# Patient Record
Sex: FEMALE | Race: WHITE | NOT HISPANIC OR LATINO | Employment: FULL TIME | URBAN - NONMETROPOLITAN AREA
[De-identification: names, ages, dates, MRNs, and addresses within clinical notes are randomized per-mention and may not be internally consistent; named-entity substitution may affect disease eponyms.]

---

## 2024-01-05 ENCOUNTER — TRANSCRIBE ORDERS (OUTPATIENT)
Dept: LAB | Facility: HOSPITAL | Age: 44
End: 2024-01-05

## 2024-01-05 ENCOUNTER — LAB (OUTPATIENT)
Dept: LAB | Facility: HOSPITAL | Age: 44
End: 2024-01-05
Payer: COMMERCIAL

## 2024-01-05 DIAGNOSIS — R74.8 ACID PHOSPHATASE ELEVATED: Primary | ICD-10-CM

## 2024-01-05 DIAGNOSIS — R74.8 ACID PHOSPHATASE ELEVATED: ICD-10-CM

## 2024-01-05 LAB
ALBUMIN SERPL-MCNC: 4.3 G/DL (ref 3.5–5.2)
ALP SERPL-CCNC: 71 U/L (ref 39–117)
ALT SERPL W P-5'-P-CCNC: 45 U/L (ref 1–33)
AST SERPL-CCNC: 81 U/L (ref 1–32)
BILIRUB CONJ SERPL-MCNC: <0.2 MG/DL (ref 0–0.3)
BILIRUB INDIRECT SERPL-MCNC: ABNORMAL MG/DL
BILIRUB SERPL-MCNC: 0.6 MG/DL (ref 0–1.2)
PROT SERPL-MCNC: 7.3 G/DL (ref 6–8.5)

## 2024-01-05 PROCEDURE — 36415 COLL VENOUS BLD VENIPUNCTURE: CPT

## 2024-01-05 PROCEDURE — 80076 HEPATIC FUNCTION PANEL: CPT

## 2024-01-11 ENCOUNTER — TRANSCRIBE ORDERS (OUTPATIENT)
Dept: ADMINISTRATIVE | Facility: HOSPITAL | Age: 44
End: 2024-01-11
Payer: COMMERCIAL

## 2024-01-11 DIAGNOSIS — R74.8 ELEVATED LIVER ENZYMES: Primary | ICD-10-CM

## 2024-01-26 ENCOUNTER — LAB (OUTPATIENT)
Dept: LAB | Facility: HOSPITAL | Age: 44
End: 2024-01-26
Payer: COMMERCIAL

## 2024-01-26 ENCOUNTER — TRANSCRIBE ORDERS (OUTPATIENT)
Dept: LAB | Facility: HOSPITAL | Age: 44
End: 2024-01-26
Payer: COMMERCIAL

## 2024-01-26 DIAGNOSIS — R79.89 HYPOURICEMIA: Primary | ICD-10-CM

## 2024-01-26 DIAGNOSIS — R79.89 HYPOURICEMIA: ICD-10-CM

## 2024-01-26 LAB
ALBUMIN SERPL-MCNC: 4.3 G/DL (ref 3.5–5.2)
ALP SERPL-CCNC: 66 U/L (ref 39–117)
ALT SERPL W P-5'-P-CCNC: 38 U/L (ref 1–33)
AST SERPL-CCNC: 70 U/L (ref 1–32)
BILIRUB CONJ SERPL-MCNC: <0.2 MG/DL (ref 0–0.3)
BILIRUB INDIRECT SERPL-MCNC: ABNORMAL MG/DL
BILIRUB SERPL-MCNC: 0.6 MG/DL (ref 0–1.2)
HAV IGM SERPL QL IA: NORMAL
HBV CORE IGM SERPL QL IA: NORMAL
HBV SURFACE AG SERPL QL IA: NORMAL
HCV AB SER DONR QL: NORMAL
PROT SERPL-MCNC: 7.3 G/DL (ref 6–8.5)

## 2024-01-26 PROCEDURE — 80076 HEPATIC FUNCTION PANEL: CPT

## 2024-01-26 PROCEDURE — 80074 ACUTE HEPATITIS PANEL: CPT

## 2024-01-26 PROCEDURE — 36415 COLL VENOUS BLD VENIPUNCTURE: CPT

## 2024-02-06 ENCOUNTER — HOSPITAL ENCOUNTER (OUTPATIENT)
Dept: ULTRASOUND IMAGING | Facility: HOSPITAL | Age: 44
Discharge: HOME | End: 2024-02-06
Admitting: INTERNAL MEDICINE
Payer: COMMERCIAL

## 2024-02-06 DIAGNOSIS — R74.8 ELEVATED LIVER ENZYMES: ICD-10-CM

## 2024-02-06 PROCEDURE — 76705 ECHO EXAM OF ABDOMEN: CPT

## 2024-03-03 ENCOUNTER — APPOINTMENT (OUTPATIENT)
Dept: GENERAL RADIOLOGY | Facility: HOSPITAL | Age: 44
End: 2024-03-03
Payer: COMMERCIAL

## 2024-03-03 ENCOUNTER — HOSPITAL ENCOUNTER (EMERGENCY)
Facility: HOSPITAL | Age: 44
Discharge: HOME | End: 2024-03-04
Attending: EMERGENCY MEDICINE | Admitting: EMERGENCY MEDICINE
Payer: COMMERCIAL

## 2024-03-03 DIAGNOSIS — R07.9 CHEST PAIN, UNSPECIFIED TYPE: Primary | ICD-10-CM

## 2024-03-03 DIAGNOSIS — R00.2 PALPITATIONS: ICD-10-CM

## 2024-03-03 LAB
ALBUMIN SERPL-MCNC: 3.9 G/DL (ref 3.5–5.2)
ALBUMIN/GLOB SERPL: 1 G/DL
ALP SERPL-CCNC: 75 U/L (ref 39–117)
ALT SERPL W P-5'-P-CCNC: 45 U/L (ref 1–33)
ANION GAP SERPL CALCULATED.3IONS-SCNC: 16.1 MMOL/L (ref 5–15)
AST SERPL-CCNC: 83 U/L (ref 1–32)
B PARAPERT DNA SPEC QL NAA+PROBE: NOT DETECTED
B PERT DNA SPEC QL NAA+PROBE: NOT DETECTED
BASOPHILS # BLD AUTO: 0.06 10*3/MM3 (ref 0–0.2)
BASOPHILS NFR BLD AUTO: 0.6 % (ref 0–1.5)
BILIRUB SERPL-MCNC: 0.6 MG/DL (ref 0–1.2)
BUN SERPL-MCNC: 8 MG/DL (ref 6–20)
BUN/CREAT SERPL: 10.3 (ref 7–25)
C PNEUM DNA NPH QL NAA+NON-PROBE: NOT DETECTED
CALCIUM SPEC-SCNC: 10 MG/DL (ref 8.6–10.5)
CHLORIDE SERPL-SCNC: 98 MMOL/L (ref 98–107)
CO2 SERPL-SCNC: 16.9 MMOL/L (ref 22–29)
CREAT SERPL-MCNC: 0.78 MG/DL (ref 0.57–1)
DEPRECATED RDW RBC AUTO: 47 FL (ref 37–54)
EGFRCR SERPLBLD CKD-EPI 2021: 97.4 ML/MIN/1.73
EOSINOPHIL # BLD AUTO: 0.16 10*3/MM3 (ref 0–0.4)
EOSINOPHIL NFR BLD AUTO: 1.7 % (ref 0.3–6.2)
ERYTHROCYTE [DISTWIDTH] IN BLOOD BY AUTOMATED COUNT: 14.1 % (ref 12.3–15.4)
FLUAV SUBTYP SPEC NAA+PROBE: NOT DETECTED
FLUBV RNA ISLT QL NAA+PROBE: NOT DETECTED
GEN 5 2HR TROPONIN T REFLEX: <6 NG/L
GLOBULIN UR ELPH-MCNC: 3.9 GM/DL
GLUCOSE SERPL-MCNC: 104 MG/DL (ref 65–99)
HADV DNA SPEC NAA+PROBE: NOT DETECTED
HCOV 229E RNA SPEC QL NAA+PROBE: NOT DETECTED
HCOV HKU1 RNA SPEC QL NAA+PROBE: NOT DETECTED
HCOV NL63 RNA SPEC QL NAA+PROBE: NOT DETECTED
HCOV OC43 RNA SPEC QL NAA+PROBE: NOT DETECTED
HCT VFR BLD AUTO: 49.6 % (ref 34–46.6)
HGB BLD-MCNC: 16 G/DL (ref 12–15.9)
HMPV RNA NPH QL NAA+NON-PROBE: NOT DETECTED
HPIV1 RNA ISLT QL NAA+PROBE: NOT DETECTED
HPIV2 RNA SPEC QL NAA+PROBE: NOT DETECTED
HPIV3 RNA NPH QL NAA+PROBE: NOT DETECTED
HPIV4 P GENE NPH QL NAA+PROBE: NOT DETECTED
IMM GRANULOCYTES # BLD AUTO: 0.02 10*3/MM3 (ref 0–0.05)
IMM GRANULOCYTES NFR BLD AUTO: 0.2 % (ref 0–0.5)
LYMPHOCYTES # BLD AUTO: 4.09 10*3/MM3 (ref 0.7–3.1)
LYMPHOCYTES NFR BLD AUTO: 43 % (ref 19.6–45.3)
M PNEUMO IGG SER IA-ACNC: NOT DETECTED
MCH RBC QN AUTO: 29.5 PG (ref 26.6–33)
MCHC RBC AUTO-ENTMCNC: 32.3 G/DL (ref 31.5–35.7)
MCV RBC AUTO: 91.3 FL (ref 79–97)
MONOCYTES # BLD AUTO: 1.04 10*3/MM3 (ref 0.1–0.9)
MONOCYTES NFR BLD AUTO: 10.9 % (ref 5–12)
NEUTROPHILS NFR BLD AUTO: 4.15 10*3/MM3 (ref 1.7–7)
NEUTROPHILS NFR BLD AUTO: 43.6 % (ref 42.7–76)
NRBC BLD AUTO-RTO: 0 /100 WBC (ref 0–0.2)
PLATELET # BLD AUTO: 226 10*3/MM3 (ref 140–450)
PMV BLD AUTO: 11.5 FL (ref 6–12)
POTASSIUM SERPL-SCNC: 4.1 MMOL/L (ref 3.5–5.2)
PROT SERPL-MCNC: 7.8 G/DL (ref 6–8.5)
RBC # BLD AUTO: 5.43 10*6/MM3 (ref 3.77–5.28)
RHINOVIRUS RNA SPEC NAA+PROBE: NOT DETECTED
RSV RNA NPH QL NAA+NON-PROBE: NOT DETECTED
SARS-COV-2 RNA NPH QL NAA+NON-PROBE: NOT DETECTED
SODIUM SERPL-SCNC: 131 MMOL/L (ref 136–145)
TROPONIN T DELTA: NORMAL
TROPONIN T SERPL HS-MCNC: <6 NG/L
WBC NRBC COR # BLD AUTO: 9.52 10*3/MM3 (ref 3.4–10.8)

## 2024-03-03 PROCEDURE — 85025 COMPLETE CBC W/AUTO DIFF WBC: CPT

## 2024-03-03 PROCEDURE — 36415 COLL VENOUS BLD VENIPUNCTURE: CPT

## 2024-03-03 PROCEDURE — 93005 ELECTROCARDIOGRAM TRACING: CPT | Performed by: EMERGENCY MEDICINE

## 2024-03-03 PROCEDURE — 71045 X-RAY EXAM CHEST 1 VIEW: CPT

## 2024-03-03 PROCEDURE — 0202U NFCT DS 22 TRGT SARS-COV-2: CPT | Performed by: EMERGENCY MEDICINE

## 2024-03-03 PROCEDURE — 84484 ASSAY OF TROPONIN QUANT: CPT | Performed by: EMERGENCY MEDICINE

## 2024-03-03 PROCEDURE — 84484 ASSAY OF TROPONIN QUANT: CPT

## 2024-03-03 PROCEDURE — 93005 ELECTROCARDIOGRAM TRACING: CPT

## 2024-03-03 PROCEDURE — 99284 EMERGENCY DEPT VISIT MOD MDM: CPT

## 2024-03-03 PROCEDURE — 80053 COMPREHEN METABOLIC PANEL: CPT

## 2024-03-03 NOTE — Clinical Note
[facility] EMERGENCY DEPARTMENT  [address] [address]  Phone: [phone]    Linn Abel was seen and treated in our emergency department on 3/3/2024.  She may return to work on 03/05/2024.         Thank you for choosing [facility].    Fransisco Michel MD

## 2024-03-03 NOTE — Clinical Note
[facility] EMERGENCY DEPARTMENT  [address] [facility] [address]  Phone: [phone]    Linn Abel was seen and treated in our emergency department on 3/3/2024.  She may return to work on 03/05/2024.         Thank you for choosing [facility].    Fransisco Michel MD

## 2024-03-04 VITALS
HEART RATE: 95 BPM | RESPIRATION RATE: 18 BRPM | SYSTOLIC BLOOD PRESSURE: 133 MMHG | OXYGEN SATURATION: 95 % | BODY MASS INDEX: 44.41 KG/M2 | TEMPERATURE: 97.8 F | DIASTOLIC BLOOD PRESSURE: 86 MMHG | WEIGHT: 293 LBS | HEIGHT: 68 IN

## 2024-03-04 NOTE — ED PROVIDER NOTES
Subjective   History of Present Illness  43-year-old female who presents for evaluation of chest pain palpitation.  She reports at 6 PM she had chest pain in the right chest.  It resolved relatively quickly.  She reports that she had been standing for a few minutes cooking when she began to have palpitations and on her iWatch she had a heart rate greater than 140s.  That resolved after roughly 15 minutes.  15 minutes later she had the same pain and it likewise resolved relatively quickly.  She denies palpitations currently.  No fever or infectious symptoms recently.  Good fluid intake.  No history of coronary artery disease.  No abdominal pain, diarrhea, or urinary symptoms include no dysuria, frequency, or urgency.  No other acute complaints.      Review of Systems   Constitutional:  Negative for chills, fatigue and fever.   HENT:  Negative for congestion, ear pain, postnasal drip, sinus pressure and sore throat.    Eyes:  Negative for pain, redness and visual disturbance.   Respiratory:  Negative for cough, chest tightness and shortness of breath.    Cardiovascular:  Positive for chest pain and palpitations. Negative for leg swelling.   Gastrointestinal:  Negative for abdominal pain, anal bleeding, blood in stool, diarrhea, nausea and vomiting.   Endocrine: Negative for polydipsia and polyuria.   Genitourinary:  Negative for difficulty urinating, dysuria, frequency and urgency.   Musculoskeletal:  Negative for arthralgias, back pain and neck pain.   Skin:  Negative for pallor and rash.   Allergic/Immunologic: Negative for environmental allergies and immunocompromised state.   Neurological:  Negative for dizziness, weakness and headaches.   Hematological:  Negative for adenopathy.   Psychiatric/Behavioral:  Negative for confusion, self-injury and suicidal ideas. The patient is not nervous/anxious.    All other systems reviewed and are negative.      No past medical history on file.    No Known Allergies    No past  surgical history on file.    No family history on file.    Social History     Socioeconomic History    Marital status:            Objective   Physical Exam  Vitals and nursing note reviewed.   Constitutional:       General: She is not in acute distress.     Appearance: Normal appearance. She is well-developed. She is not toxic-appearing or diaphoretic.   HENT:      Head: Normocephalic and atraumatic.      Right Ear: External ear normal.      Left Ear: External ear normal.      Nose: Nose normal.   Eyes:      General: Lids are normal.      Pupils: Pupils are equal, round, and reactive to light.   Neck:      Trachea: No tracheal deviation.   Cardiovascular:      Rate and Rhythm: Regular rhythm. Tachycardia present.      Pulses: No decreased pulses.      Heart sounds: Normal heart sounds. No murmur heard.     No friction rub. No gallop.   Pulmonary:      Effort: Pulmonary effort is normal. No respiratory distress.      Breath sounds: Normal breath sounds. No decreased breath sounds, wheezing, rhonchi or rales.   Abdominal:      General: Bowel sounds are normal.      Palpations: Abdomen is soft.      Tenderness: There is no abdominal tenderness. There is no guarding or rebound.   Musculoskeletal:         General: No deformity. Normal range of motion.      Cervical back: Normal range of motion and neck supple.   Lymphadenopathy:      Cervical: No cervical adenopathy.   Skin:     General: Skin is warm and dry.      Findings: No rash.   Neurological:      Mental Status: She is alert and oriented to person, place, and time.      Cranial Nerves: No cranial nerve deficit.      Sensory: No sensory deficit.   Psychiatric:         Speech: Speech normal.         Behavior: Behavior normal.         Thought Content: Thought content normal.         Judgment: Judgment normal.         Procedures           ED Course  ED Course as of 03/03/24 2350   Sun Mar 03, 2024   2143 EKG independently interpreted by myself shows mild ST  depression and T wave inversions in the inferior and anterolateral leads.  Right bundle branch block. [NS]      ED Course User Index  [NS] Frasnisco Michel MD                                             Medical Decision Making  Differential diagnosis includes coronary syndrome, chest wall pain, pleurisy, costochondritis, dysrhythmia, other unspecified etiology.    EKG independently interpreted by myself shows sinus rhythm with no acute ischemic changes.  Labs show normal findings.  Troponin x 2 is normal.    Lab evaluation shows normal kidney function with no significant electrolyte normalities.    H&H is slightly elevated which may be a component of dehydration.    Discharged with referral to cardiology for outpatient follow-up.    Problems Addressed:  Chest pain, unspecified type: complicated acute illness or injury with systemic symptoms  Palpitations: complicated acute illness or injury with systemic symptoms    Amount and/or Complexity of Data Reviewed  Independent Historian: spouse     Details:  give additional information  External Data Reviewed: labs and radiology.  Labs: ordered. Decision-making details documented in ED Course.  Radiology: ordered and independent interpretation performed. Decision-making details documented in ED Course.  ECG/medicine tests: ordered and independent interpretation performed. Decision-making details documented in ED Course.        Final diagnoses:   Palpitations   Chest pain, unspecified type       ED Disposition  ED Disposition       ED Disposition   Discharge    Condition   Stable    Comment   --               Mikie Ang MD  [address] [facility] [id number] [phone]    In 1 week      Aubrey Singleton MD  [address] [address] [phone]    Schedule an appointment as soon as possible for a visit            Medication List      No changes were made to your prescriptions during this visit.            Fransisco Michel,  MD  03/03/24 3359

## 2024-03-04 NOTE — DISCHARGE INSTRUCTIONS
Follow-up with cardiology for further outpatient evaluation of chest pain and palpitations.    Make sure to drink plenty of fluid.    Return to the ER with any further concern.

## 2024-03-25 ENCOUNTER — OFFICE VISIT (OUTPATIENT)
Dept: CARDIOLOGY | Facility: CLINIC | Age: 44
End: 2024-03-25
Payer: COMMERCIAL

## 2024-03-25 VITALS
WEIGHT: 293 LBS | DIASTOLIC BLOOD PRESSURE: 68 MMHG | BODY MASS INDEX: 44.41 KG/M2 | SYSTOLIC BLOOD PRESSURE: 130 MMHG | HEART RATE: 81 BPM | HEIGHT: 68 IN | OXYGEN SATURATION: 97 %

## 2024-03-25 DIAGNOSIS — R00.2 PALPITATIONS: Primary | ICD-10-CM

## 2024-03-25 DIAGNOSIS — I10 PRIMARY HYPERTENSION: ICD-10-CM

## 2024-03-25 DIAGNOSIS — E66.01 MORBID OBESITY WITH BMI OF 40.0-44.9, ADULT: ICD-10-CM

## 2024-03-25 PROCEDURE — 99244 OFF/OP CNSLTJ NEW/EST MOD 40: CPT | Performed by: INTERNAL MEDICINE

## 2024-03-25 RX ORDER — ATORVASTATIN CALCIUM 10 MG/1
10 TABLET, FILM COATED ORAL DAILY
COMMUNITY

## 2024-03-25 RX ORDER — BISOPROLOL FUMARATE AND HYDROCHLOROTHIAZIDE 2.5; 6.25 MG/1; MG/1
1 TABLET ORAL DAILY
COMMUNITY

## 2024-03-25 NOTE — PROGRESS NOTES
"     [facility] Cardiology OP Consult Note    Linn Abel  [id number]  2024    Referred By: Isidro Rajput PA-C    Chief Complaint: Palpitations    History of Present Illness:   Mrs. Linn Abel is a 43 y.o. female who presents to the Cardiology Clinic for evaluation of palpitations.  The patient has a past medical history significant for hypertension, anxiety, and obesity with a BMI 44.  She does not have any significant past cardiac history.  She presents today for evaluation of palpitations.  The patient reports a history of occasional palpitations described as a \"fast heart rate\" which in the past has been related to periods of increased stress and anxiety.  On 3/3/2024 the patient had a recurrent episode while at home.  She reports her heart rate increased to the 150s and was symptomatic with palpitations.  She did not have any significant dizziness or lightheadedness.  She did describe a \"dull\" discomfort located over her right anterior chest during her palpitations.  The palpitations persisted and she presented to the emergency department for evaluations.  While in the emergency department, her heart rate was 100 bpm.  ECG did not show any significant abnormalities.  She also had blood work including a troponin level x 2 which was within normal limits.  Following her emergency department visit, the patient has had several additional episodes of mild tachycardia with no clear precipitating factors.  No history of presyncopal or syncopal events.  She has been exercising on a regular basis without chest discomfort or significant exertional dyspnea.  No other specific complaints today.    Past Cardiac Testin. Last Coronary Angio: None  2. Prior Stress Testing: None  3. Last Echo: None  4. Prior Holter Monitor: None    Review of Systems:   Review of Systems   Constitutional:  Negative for activity change, appetite change, chills, diaphoresis, fatigue, fever, unexpected weight " "gain and unexpected weight loss.   Eyes:  Negative for blurred vision and double vision.   Respiratory:  Negative for cough, chest tightness, shortness of breath and wheezing.    Cardiovascular:  Positive for chest pain and palpitations. Negative for leg swelling.   Gastrointestinal:  Negative for abdominal pain, anal bleeding, blood in stool and GERD.   Endocrine: Negative for cold intolerance and heat intolerance.   Genitourinary:  Negative for hematuria.   Neurological:  Negative for dizziness, syncope, weakness and light-headedness.   Hematological:  Does not bruise/bleed easily.   Psychiatric/Behavioral:  Negative for depressed mood and stress. The patient is not nervous/anxious.        Past Medical History:   Past Medical History:   Diagnosis Date    Hypertension        Past Surgical History: No past surgical history on file.    Family History: No family history on file.    Social History:   Social History     Socioeconomic History    Marital status:    Tobacco Use    Smoking status: Never    Smokeless tobacco: Never   Vaping Use    Vaping status: Never Used   Substance and Sexual Activity    Drug use: Never       Medications:     Current Outpatient Medications:     atorvastatin (LIPITOR) 10 MG tablet, Take 1 tablet by mouth Daily., Disp: , Rfl:     bisoprolol-hydrochlorothiazide (ZIAC) 2.5-6.25 MG per tablet, Take 1 tablet by mouth Daily., Disp: , Rfl:     Allergies:   No Known Allergies    Physical Exam:  Vital Signs:   Vitals:    03/25/24 0814   BP: 130/68   BP Location: Left arm   Patient Position: Sitting   Pulse: 81   SpO2: 97%   Weight: 133 kg (293 lb)   Height: 172.7 cm (67.99\")       Physical Exam  Constitutional:       General: She is not in acute distress.     Appearance: She is well-developed. She is obese. She is not diaphoretic.   HENT:      Head: Normocephalic and atraumatic.   Eyes:      General: No scleral icterus.     Pupils: Pupils are equal, round, and reactive to light.   Neck:      " Trachea: No tracheal deviation.   Cardiovascular:      Rate and Rhythm: Normal rate and regular rhythm.      Heart sounds: Normal heart sounds. No murmur heard.     No friction rub. No gallop.      Comments: Normal JVD.  Pulmonary:      Effort: Pulmonary effort is normal. No respiratory distress.      Breath sounds: Normal breath sounds. No stridor. No wheezing or rales.   Chest:      Chest wall: No tenderness.   Abdominal:      General: Bowel sounds are normal. There is no distension.      Palpations: Abdomen is soft.      Tenderness: There is no abdominal tenderness. There is no guarding or rebound.   Musculoskeletal:         General: No swelling. Normal range of motion.      Cervical back: Neck supple. No tenderness.   Lymphadenopathy:      Cervical: No cervical adenopathy.   Skin:     General: Skin is warm and dry.      Findings: No erythema.   Neurological:      General: No focal deficit present.      Mental Status: She is alert and oriented to person, place, and time.   Psychiatric:         Mood and Affect: Mood normal.         Behavior: Behavior normal.         Results Review:   I reviewed the patient's new clinical results.        Assessment / Plan:     1. Palpitations  -- Occasional episodes of palpitations, underlying etiology unclear however potentially related to anxiety  -- Recent ECG showed NSR with incomplete right bundle branch block  -- Last TSH within normal limits  -- Will proceed with outpatient cardiac monitor to further rule out paroxysmal tachyarrhythmia  -- Echocardiogram to rule underlying structural valvular abnormalities  -- Follow-up as needed, pending results of cardiac testing    2. Primary hypertension  -- BP adequately controlled    3. Morbid obesity with BMI of 40.0-44.9, adult  -- Weight loss advised        Follow Up:   Return if symptoms worsen or fail to improve.      Thank you for allowing me to participate in the care of your patient. Please to not hesitate to contact me with  additional questions or concerns.     JUAN DAVID Rivera MD  Interventional Cardiology   03/25/2024  08:36 EDT

## 2024-12-05 ENCOUNTER — APPOINTMENT (OUTPATIENT)
Dept: GENERAL RADIOLOGY | Facility: HOSPITAL | Age: 44
End: 2024-12-05
Payer: COMMERCIAL

## 2024-12-05 ENCOUNTER — HOSPITAL ENCOUNTER (OUTPATIENT)
Facility: HOSPITAL | Age: 44
Setting detail: OBSERVATION
Discharge: HOME | End: 2024-12-06
Attending: EMERGENCY MEDICINE | Admitting: FAMILY MEDICINE
Payer: COMMERCIAL

## 2024-12-05 DIAGNOSIS — R79.89 ELEVATED TROPONIN: Primary | ICD-10-CM

## 2024-12-05 LAB
ALBUMIN SERPL-MCNC: 4.2 G/DL (ref 3.5–5.2)
ALBUMIN/GLOB SERPL: 1.2 G/DL
ALP SERPL-CCNC: 75 U/L (ref 39–117)
ALT SERPL W P-5'-P-CCNC: 17 U/L (ref 1–33)
ANION GAP SERPL CALCULATED.3IONS-SCNC: 10.5 MMOL/L (ref 5–15)
AST SERPL-CCNC: 47 U/L (ref 1–32)
BASOPHILS # BLD AUTO: 0.05 10*3/MM3 (ref 0–0.2)
BASOPHILS NFR BLD AUTO: 0.4 % (ref 0–1.5)
BILIRUB SERPL-MCNC: 0.4 MG/DL (ref 0–1.2)
BUN SERPL-MCNC: 9 MG/DL (ref 6–20)
BUN/CREAT SERPL: 13.6 (ref 7–25)
CALCIUM SPEC-SCNC: 9.4 MG/DL (ref 8.6–10.5)
CHLORIDE SERPL-SCNC: 95 MMOL/L (ref 98–107)
CO2 SERPL-SCNC: 23.5 MMOL/L (ref 22–29)
CREAT SERPL-MCNC: 0.66 MG/DL (ref 0.57–1)
DEPRECATED RDW RBC AUTO: 42.4 FL (ref 37–54)
EGFRCR SERPLBLD CKD-EPI 2021: 111.8 ML/MIN/1.73
EOSINOPHIL # BLD AUTO: 0.17 10*3/MM3 (ref 0–0.4)
EOSINOPHIL NFR BLD AUTO: 1.3 % (ref 0.3–6.2)
ERYTHROCYTE [DISTWIDTH] IN BLOOD BY AUTOMATED COUNT: 13.2 % (ref 12.3–15.4)
GLOBULIN UR ELPH-MCNC: 3.4 GM/DL
GLUCOSE SERPL-MCNC: 119 MG/DL (ref 65–99)
HCT VFR BLD AUTO: 44.1 % (ref 34–46.6)
HGB BLD-MCNC: 15 G/DL (ref 12–15.9)
HOLD SPECIMEN: NORMAL
HOLD SPECIMEN: NORMAL
IMM GRANULOCYTES # BLD AUTO: 0.06 10*3/MM3 (ref 0–0.05)
IMM GRANULOCYTES NFR BLD AUTO: 0.5 % (ref 0–0.5)
LYMPHOCYTES # BLD AUTO: 2.9 10*3/MM3 (ref 0.7–3.1)
LYMPHOCYTES NFR BLD AUTO: 22.8 % (ref 19.6–45.3)
MAGNESIUM SERPL-MCNC: 1.9 MG/DL (ref 1.6–2.6)
MCH RBC QN AUTO: 30 PG (ref 26.6–33)
MCHC RBC AUTO-ENTMCNC: 34 G/DL (ref 31.5–35.7)
MCV RBC AUTO: 88.2 FL (ref 79–97)
MONOCYTES # BLD AUTO: 1.23 10*3/MM3 (ref 0.1–0.9)
MONOCYTES NFR BLD AUTO: 9.7 % (ref 5–12)
NEUTROPHILS NFR BLD AUTO: 65.3 % (ref 42.7–76)
NEUTROPHILS NFR BLD AUTO: 8.33 10*3/MM3 (ref 1.7–7)
NRBC BLD AUTO-RTO: 0 /100 WBC (ref 0–0.2)
NT-PROBNP SERPL-MCNC: 1041 PG/ML (ref 0–450)
PLATELET # BLD AUTO: 323 10*3/MM3 (ref 140–450)
PMV BLD AUTO: 10.2 FL (ref 6–12)
POTASSIUM SERPL-SCNC: 3.6 MMOL/L (ref 3.5–5.2)
PROT SERPL-MCNC: 7.6 G/DL (ref 6–8.5)
RBC # BLD AUTO: 5 10*6/MM3 (ref 3.77–5.28)
SODIUM SERPL-SCNC: 129 MMOL/L (ref 136–145)
TROPONIN T SERPL HS-MCNC: 26 NG/L
TROPONIN T SERPL HS-MCNC: 31 NG/L
TROPONIN T SERPL HS-MCNC: 40 NG/L
TSH SERPL DL<=0.05 MIU/L-ACNC: 3.06 UIU/ML (ref 0.27–4.2)
WBC NRBC COR # BLD AUTO: 12.74 10*3/MM3 (ref 3.4–10.8)
WHOLE BLOOD HOLD COAG: NORMAL
WHOLE BLOOD HOLD SPECIMEN: NORMAL

## 2024-12-05 PROCEDURE — 85025 COMPLETE CBC W/AUTO DIFF WBC: CPT | Performed by: EMERGENCY MEDICINE

## 2024-12-05 PROCEDURE — 83735 ASSAY OF MAGNESIUM: CPT | Performed by: PHYSICIAN ASSISTANT

## 2024-12-05 PROCEDURE — 80053 COMPREHEN METABOLIC PANEL: CPT | Performed by: EMERGENCY MEDICINE

## 2024-12-05 PROCEDURE — G0378 HOSPITAL OBSERVATION PER HR: HCPCS

## 2024-12-05 PROCEDURE — 99223 1ST HOSP IP/OBS HIGH 75: CPT | Performed by: FAMILY MEDICINE

## 2024-12-05 PROCEDURE — 84703 CHORIONIC GONADOTROPIN ASSAY: CPT | Performed by: FAMILY MEDICINE

## 2024-12-05 PROCEDURE — 99284 EMERGENCY DEPT VISIT MOD MDM: CPT | Performed by: EMERGENCY MEDICINE

## 2024-12-05 PROCEDURE — 71045 X-RAY EXAM CHEST 1 VIEW: CPT

## 2024-12-05 PROCEDURE — 84443 ASSAY THYROID STIM HORMONE: CPT | Performed by: PHYSICIAN ASSISTANT

## 2024-12-05 PROCEDURE — 84484 ASSAY OF TROPONIN QUANT: CPT | Performed by: PHYSICIAN ASSISTANT

## 2024-12-05 PROCEDURE — 96374 THER/PROPH/DIAG INJ IV PUSH: CPT

## 2024-12-05 PROCEDURE — 93005 ELECTROCARDIOGRAM TRACING: CPT | Performed by: PHYSICIAN ASSISTANT

## 2024-12-05 PROCEDURE — 85379 FIBRIN DEGRADATION QUANT: CPT | Performed by: FAMILY MEDICINE

## 2024-12-05 PROCEDURE — 84484 ASSAY OF TROPONIN QUANT: CPT | Performed by: EMERGENCY MEDICINE

## 2024-12-05 PROCEDURE — 83880 ASSAY OF NATRIURETIC PEPTIDE: CPT | Performed by: EMERGENCY MEDICINE

## 2024-12-05 PROCEDURE — 96372 THER/PROPH/DIAG INJ SC/IM: CPT

## 2024-12-05 PROCEDURE — 93005 ELECTROCARDIOGRAM TRACING: CPT | Performed by: EMERGENCY MEDICINE

## 2024-12-05 RX ORDER — DILTIAZEM HYDROCHLORIDE 5 MG/ML
20 INJECTION INTRAVENOUS ONCE
Status: COMPLETED | OUTPATIENT
Start: 2024-12-05 | End: 2024-12-05

## 2024-12-05 RX ORDER — SODIUM CHLORIDE 0.9 % (FLUSH) 0.9 %
10 SYRINGE (ML) INJECTION AS NEEDED
Status: DISCONTINUED | OUTPATIENT
Start: 2024-12-05 | End: 2024-12-06 | Stop reason: HOSPADM

## 2024-12-05 RX ADMIN — Medication 10 ML: at 18:49

## 2024-12-05 RX ADMIN — DILTIAZEM HYDROCHLORIDE 20 MG: 5 INJECTION, SOLUTION INTRAVENOUS at 18:47

## 2024-12-05 NOTE — ED PROVIDER NOTES
" EMERGENCY DEPARTMENT ENCOUNTER    Pt Name: Linn Abel  MRN: [id number]  Pt :   1980  Room Number:  CDU3/03  Date of encounter:  2024  PCP: Mikie Ang MD  ED Provider: Len Sue PA-C    Historian: Patient and significant other      HPI:  Chief Complaint   Patient presents with    Palpitations          Context: Linn Abel is a 44 y.o. female who presents to the ED c/o palpitations.  Patient states she has a history of \"tachycardia.\"  Is on Bisprolol/HCTZ and takes this at night and has not had tonight's dose.  First episode was March of this year, seen at this facility.  Palpitations had resolved by the time she presented here.  Her workup was unremarkable and she was discharged home to follow-up with cardiology.  She states she wore a Holter monitor but had no abnormal findings after 2 weeks.  Had another episode of palpitations in  of this year was seen at [facility] again no abnormal findings.  Is on Xanax and sertraline for anxiety although denies significant anxiety at this time.  Symptoms started at 1630 hrs. today while sitting at work at the desk.  Has no chest pain or shortness of breath.  States her PCP checked her thyroid recently which was normal.  Denies excessive caffeine use.  No methamphetamine or cocaine use.  Other than hypertension and hyperlipidemia as she has no other significant past medical history.  Has tried vagal maneuvers at home without improvement.  Was seen at [facility] and sent here for eval.  No personal history of DVT or PE      PAST MEDICAL HISTORY  Past Medical History:   Diagnosis Date    Hyperlipidemia     Hypertension     Tachycardia          PAST SURGICAL HISTORY  History reviewed. No pertinent surgical history.      FAMILY HISTORY  History reviewed. No pertinent family history.      SOCIAL HISTORY  Social History     Socioeconomic History    Marital status:    Tobacco Use    Smoking status: Never    Smokeless " tobacco: Never   Vaping Use    Vaping status: Never Used   Substance and Sexual Activity    Drug use: Never         ALLERGIES  Patient has no known allergies.        REVIEW OF SYSTEMS  Review of Systems   Constitutional: Negative.    HENT: Negative.     Eyes: Negative.    Respiratory: Negative.  Negative for cough and shortness of breath.    Cardiovascular: Negative.  Positive for palpitations. Negative for chest pain and leg swelling.   Gastrointestinal: Negative.    Genitourinary: Negative.    Musculoskeletal: Negative.    Skin: Negative.    Neurological: Negative.    Psychiatric/Behavioral: Negative.          All systems reviewed and negative except for those discussed in HPI.       PHYSICAL EXAM    I have reviewed the triage vital signs and nursing notes.    ED Triage Vitals [12/05/24 1811]   Temp Heart Rate Resp BP SpO2   98 °F (36.7 °C) (!) 175 18 114/93 100 %      Temp src Heart Rate Source Patient Position BP Location FiO2 (%)   Oral Monitor Sitting Left arm --       Physical Exam  Vitals and nursing note reviewed.   Constitutional:       Appearance: Normal appearance. She is obese.   HENT:      Head: Normocephalic and atraumatic.      Nose: Nose normal.   Eyes:      Extraocular Movements: Extraocular movements intact.   Cardiovascular:      Rate and Rhythm: Tachycardia present.   Pulmonary:      Effort: Pulmonary effort is normal.   Abdominal:      General: Abdomen is flat.   Musculoskeletal:         General: No swelling or tenderness. Normal range of motion.      Cervical back: Normal range of motion.      Right lower leg: No edema.      Left lower leg: No edema.   Skin:     General: Skin is warm and dry.   Neurological:      General: No focal deficit present.      Mental Status: She is alert. Mental status is at baseline.   Psychiatric:         Mood and Affect: Mood normal.         Behavior: Behavior normal.            LAB RESULTS  Recent Results (from the past 24 hours)   Comprehensive Metabolic Panel     Collection Time: 12/05/24  6:19 PM    Specimen: Blood   Result Value Ref Range    Glucose 119 (H) 65 - 99 mg/dL    BUN 9 6 - 20 mg/dL    Creatinine 0.66 0.57 - 1.00 mg/dL    Sodium 129 (L) 136 - 145 mmol/L    Potassium 3.6 3.5 - 5.2 mmol/L    Chloride 95 (L) 98 - 107 mmol/L    CO2 23.5 22.0 - 29.0 mmol/L    Calcium 9.4 8.6 - 10.5 mg/dL    Total Protein 7.6 6.0 - 8.5 g/dL    Albumin 4.2 3.5 - 5.2 g/dL    ALT (SGPT) 17 1 - 33 U/L    AST (SGOT) 47 (H) 1 - 32 U/L    Alkaline Phosphatase 75 39 - 117 U/L    Total Bilirubin 0.4 0.0 - 1.2 mg/dL    Globulin 3.4 gm/dL    A/G Ratio 1.2 g/dL    BUN/Creatinine Ratio 13.6 7.0 - 25.0    Anion Gap 10.5 5.0 - 15.0 mmol/L    eGFR 111.8 >60.0 mL/min/1.73   BNP    Collection Time: 12/05/24  6:19 PM    Specimen: Blood   Result Value Ref Range    proBNP 1,041.0 (H) 0.0 - 450.0 pg/mL   Single High Sensitivity Troponin T    Collection Time: 12/05/24  6:19 PM    Specimen: Blood   Result Value Ref Range    HS Troponin T 26 (H) <14 ng/L   Green Top (Gel)    Collection Time: 12/05/24  6:19 PM   Result Value Ref Range    Extra Tube Hold for add-ons.    Lavender Top    Collection Time: 12/05/24  6:19 PM   Result Value Ref Range    Extra Tube hold for add-on    Gold Top - SST    Collection Time: 12/05/24  6:19 PM   Result Value Ref Range    Extra Tube Hold for add-ons.    Light Blue Top    Collection Time: 12/05/24  6:19 PM   Result Value Ref Range    Extra Tube Hold for add-ons.    CBC Auto Differential    Collection Time: 12/05/24  6:19 PM    Specimen: Blood   Result Value Ref Range    WBC 12.74 (H) 3.40 - 10.80 10*3/mm3    RBC 5.00 3.77 - 5.28 10*6/mm3    Hemoglobin 15.0 12.0 - 15.9 g/dL    Hematocrit 44.1 34.0 - 46.6 %    MCV 88.2 79.0 - 97.0 fL    MCH 30.0 26.6 - 33.0 pg    MCHC 34.0 31.5 - 35.7 g/dL    RDW 13.2 12.3 - 15.4 %    RDW-SD 42.4 37.0 - 54.0 fl    MPV 10.2 6.0 - 12.0 fL    Platelets 323 140 - 450 10*3/mm3    Neutrophil % 65.3 42.7 - 76.0 %    Lymphocyte % 22.8 19.6 - 45.3 %     Monocyte % 9.7 5.0 - 12.0 %    Eosinophil % 1.3 0.3 - 6.2 %    Basophil % 0.4 0.0 - 1.5 %    Immature Grans % 0.5 0.0 - 0.5 %    Neutrophils, Absolute 8.33 (H) 1.70 - 7.00 10*3/mm3    Lymphocytes, Absolute 2.90 0.70 - 3.10 10*3/mm3    Monocytes, Absolute 1.23 (H) 0.10 - 0.90 10*3/mm3    Eosinophils, Absolute 0.17 0.00 - 0.40 10*3/mm3    Basophils, Absolute 0.05 0.00 - 0.20 10*3/mm3    Immature Grans, Absolute 0.06 (H) 0.00 - 0.05 10*3/mm3    nRBC 0.0 0.0 - 0.2 /100 WBC   Magnesium    Collection Time: 12/05/24  6:19 PM    Specimen: Blood   Result Value Ref Range    Magnesium 1.9 1.6 - 2.6 mg/dL   TSH    Collection Time: 12/05/24  6:19 PM    Specimen: Blood   Result Value Ref Range    TSH 3.060 0.270 - 4.200 uIU/mL   Single High Sensitivity Troponin T    Collection Time: 12/05/24  8:04 PM    Specimen: Blood   Result Value Ref Range    HS Troponin T 31 (H) <14 ng/L   Single High Sensitivity Troponin T    Collection Time: 12/05/24 10:19 PM    Specimen: Blood   Result Value Ref Range    HS Troponin T 40 (H) <14 ng/L       If labs were ordered, I independently reviewed the results and considered them in treating the patient.        RADIOLOGY  No Radiology Exams Resulted Within Past 24 Hours        PROCEDURES    Procedures    Interpretations    O2 Sat: The patients oxygen saturation was 100% on Room Air.  This was independently interpreted by me as Normal    EKG: I reviewed and independently interpreted the EKG as tachycardic rhythm with a rate of 167 further evaluation limited secondary to right    Cardiac Monitoring: I reviewed and independently interpreted the Rhythm Strip as Normal Sinus rhythm rate of 77    Radiology: I ordered and independently reviewed the above noted radiographic studies.  I viewed images of Chest Xray which showed No pulmonary process per my independent interpretation. See radiologist's dictation for official interpretation.     MEDICATIONS GIVEN IN ER    Medications   sodium chloride 0.9 % flush  10 mL (10 mL Intravenous Given 12/5/24 1849)   dilTIAZem (CARDIZEM) injection 20 mg (20 mg Intravenous Given 12/5/24 1847)         MEDICAL DECISION MAKING, PROGRESS, and CONSULTS    All labs, if obtained, have been independently reviewed by me.  All radiology studies, if obtained, have been reviewed by me and the radiologist dictating the report.  All EKG's, if obtained, have been independently viewed and interpreted by me      Discussion below represents my analysis of pertinent findings related to patient's condition, differential diagnosis, treatment plan and final disposition.      Differential diagnosis:    Underlying arrhythmia, sinus tachycardia, electrolyte abnormality    Additional Sources:  None      Orders placed during this visit:  Orders Placed This Encounter   Procedures    XR Chest 1 View    [place] Draw    Comprehensive Metabolic Panel    BNP    Single High Sensitivity Troponin T    CBC Auto Differential    Magnesium    TSH    Single High Sensitivity Troponin T    Single High Sensitivity Troponin T    NPO Diet NPO Type: Strict NPO    Undress & Gown    Continuous Pulse Oximetry    Vital Signs    Oxygen Therapy- Nasal Cannula; Titrate 1-6 LPM Per SpO2; 90 - 95%    ECG 12 Lead ED Triage Standing Order; SOA    ECG 12 Lead Rhythm Change    ECG 12 Lead Tachycardia    Insert Peripheral IV    Initiate Observation Status    CBC & Differential    Green Top (Gel)    Lavender Top    Gold Top - SST    Light Blue Top         Additional orders considered but not ordered:  None    ED Course:    Consultants:  None    ED Course as of 12/05/24 2334   Thu Dec 05, 2024   1816   EKG Interpretation    Evaluated and interpreted by emergency department physician    Rhythm: Normal Sinus Rhythm  Rate:167  Axis: Oct[place]  Ectopy: none  Conduction: Normal  ST Segments: Nonspecific  T Waves: Normal  Q Waves: None    Clinical Impression: Atrial flutter with a 2-1 rate versus supraventricular tachycardia    Ganga Nava,  DO   [CR]   1854 Repeat EKG demonstrates diffuse ST depressions in 2 3 and aVF in the lateral leads, also noted T wave inversions.  Q waves noted in aVL, aVR [CR]   1903 Patient is now in a normal sinus rhythm with a rate of 78 bpm.  Oxygen 100% on room air.  Her symptoms have resolved after Cardizem.  Repeat EKG obtained and reviewed with Dr. Santos shows inverted T waves in the inferior leads [TM]   1923 HS Troponin T(!): 26 [TM]   1923 proBNP(!): 1,041.0 [TM]   1923 TSH Baseline: 3.060 [TM]   2115 Patient's case discussed me, patient noted to have severe tachycardia on arrival, appeared to be atrial flutter with a possible 2-1 block.  Patient is already on a beta-blocker.  Patient converted with Cardizem without any issues, has been normal rate here.  On repeat EKG demonstrated sinus rhythm.  Patient may have been in sinus tachycardia.  I do feel patient is clinically stable for discharge at this time, is following up with her cardiologist week from today.  Will discuss if she can move that up to earlier next week but I do feel this is appropriate follow-up.  Patient struck to return for any worsening lightheadedness dizziness or tachycardia.  Patient is mildly hyponatremic, will have patient follow-up with her primary care doctor for repeat labs outpatient, she has appointment on Tuesday. [CR]   2116 Initial troponin was noted to be elevated, most likely secondary to tachycardia, repeat troponin did trend up.  Plan to repeat a third troponin to ensure that it is trending down as I feel this most likely secondary to her tachycardia and not acute coronary syndrome [CR]   2208 Patient second EKG had T wave inversions diffusely and some ST depression in the inferior leads however no STEMI.  3rd EKG obtained reveals near normalization of T waves and ST depression, Q-wave that was in aVL on second EKG is now improved.  Patient remains in normal sinus rhythm with a rate in the 70s and asymptomatic. [TM]   2222  Repeat EKG demonstrates that the ST depressions have mostly all resolved.  T wave versions only isolated in lead III.  T waves have resolved in aVL. [CR]   2233 Noted sodium of 129 here today, it was 131, 9 months ago.  Patient remains normal sinus rhythm with a rate of 74 bpm.  She is asymptomatic and hemodynamically stable.  Third troponin pending. [TM]   2333 Given persistent elevation in troponin hospitalist consulted regarding admission.  Dr. Mg graciously accepts. [TM]      ED Course User Index  [CR] Ganga Nava, DO  [TM] Len Sue PA-C           After my consideration of clinical presentation and any laboratory/radiology studies obtained, I discussed the findings with the patient/patient representative who is in agreement with the treatment plan and the final disposition. Risks and benefits of discharge were discussed.     AS OF 23:34 EST VITALS:    BP - 103/77  HR - 70  TEMP - 98 °F (36.7 °C) (Oral)  O2 SATS - 98%    I reviewed the patients prescription monitoring report if available prior to discharge    DIAGNOSIS  Final diagnoses:   Elevated troponin         DISPOSITION  ED Disposition       ED Disposition   Decision to Admit    Condition   --    Comment   Level of Care: Telemetry [5]   Diagnosis: Elevated troponin [[id number]]                     Please note that portions of this document were completed with voice recognition software.        Len Sue PA-C  12/05/24 7825

## 2024-12-06 VITALS
RESPIRATION RATE: 12 BRPM | TEMPERATURE: 98 F | DIASTOLIC BLOOD PRESSURE: 80 MMHG | WEIGHT: 293 LBS | HEIGHT: 67 IN | HEART RATE: 84 BPM | SYSTOLIC BLOOD PRESSURE: 123 MMHG | BODY MASS INDEX: 45.99 KG/M2 | OXYGEN SATURATION: 100 %

## 2024-12-06 LAB
AMPHET+METHAMPHET UR QL: NEGATIVE
AMPHETAMINES UR QL: NEGATIVE
ANION GAP SERPL CALCULATED.3IONS-SCNC: 10.3 MMOL/L (ref 5–15)
BARBITURATES UR QL SCN: NEGATIVE
BENZODIAZ UR QL SCN: POSITIVE
BILIRUB UR QL STRIP: NEGATIVE
BUN SERPL-MCNC: 7 MG/DL (ref 6–20)
BUN/CREAT SERPL: 11.9 (ref 7–25)
BUPRENORPHINE SERPL-MCNC: NEGATIVE NG/ML
CALCIUM SPEC-SCNC: 9 MG/DL (ref 8.6–10.5)
CANNABINOIDS SERPL QL: NEGATIVE
CHLORIDE SERPL-SCNC: 101 MMOL/L (ref 98–107)
CLARITY UR: CLEAR
CO2 SERPL-SCNC: 23.7 MMOL/L (ref 22–29)
COCAINE UR QL: NEGATIVE
COLOR UR: YELLOW
CREAT SERPL-MCNC: 0.59 MG/DL (ref 0.57–1)
D DIMER PPP FEU-MCNC: 0.37 MCGFEU/ML (ref 0–0.5)
DEPRECATED RDW RBC AUTO: 41.7 FL (ref 37–54)
EGFRCR SERPLBLD CKD-EPI 2021: 114.8 ML/MIN/1.73
ERYTHROCYTE [DISTWIDTH] IN BLOOD BY AUTOMATED COUNT: 13.1 % (ref 12.3–15.4)
FENTANYL UR-MCNC: NEGATIVE NG/ML
GEN 5 1HR TROPONIN T REFLEX: 32 NG/L
GLUCOSE SERPL-MCNC: 104 MG/DL (ref 65–99)
GLUCOSE UR STRIP-MCNC: NEGATIVE MG/DL
HBA1C MFR BLD: 5.6 % (ref 4.8–5.6)
HCG SERPL QL: NEGATIVE
HCT VFR BLD AUTO: 39.8 % (ref 34–46.6)
HGB BLD-MCNC: 13.8 G/DL (ref 12–15.9)
HGB UR QL STRIP.AUTO: NEGATIVE
KETONES UR QL STRIP: ABNORMAL
LEUKOCYTE ESTERASE UR QL STRIP.AUTO: NEGATIVE
LIPASE SERPL-CCNC: 23 U/L (ref 13–60)
MCH RBC QN AUTO: 30.4 PG (ref 26.6–33)
MCHC RBC AUTO-ENTMCNC: 34.7 G/DL (ref 31.5–35.7)
MCV RBC AUTO: 87.7 FL (ref 79–97)
METHADONE UR QL SCN: NEGATIVE
NITRITE UR QL STRIP: NEGATIVE
OPIATES UR QL: NEGATIVE
OXYCODONE UR QL SCN: NEGATIVE
PCP UR QL SCN: NEGATIVE
PH UR STRIP.AUTO: 6 [PH] (ref 5–8)
PLATELET # BLD AUTO: 270 10*3/MM3 (ref 140–450)
PMV BLD AUTO: 10.5 FL (ref 6–12)
POTASSIUM SERPL-SCNC: 3.9 MMOL/L (ref 3.5–5.2)
PROT UR QL STRIP: ABNORMAL
RBC # BLD AUTO: 4.54 10*6/MM3 (ref 3.77–5.28)
SODIUM SERPL-SCNC: 135 MMOL/L (ref 136–145)
SP GR UR STRIP: 1.02 (ref 1–1.03)
TRICYCLICS UR QL SCN: NEGATIVE
TROPONIN T DELTA: 4 NG/L
TROPONIN T SERPL HS-MCNC: 28 NG/L
UROBILINOGEN UR QL STRIP: ABNORMAL
WBC NRBC COR # BLD AUTO: 7.91 10*3/MM3 (ref 3.4–10.8)

## 2024-12-06 PROCEDURE — 25010000002 ENOXAPARIN PER 10 MG: Performed by: FAMILY MEDICINE

## 2024-12-06 PROCEDURE — 85027 COMPLETE CBC AUTOMATED: CPT | Performed by: FAMILY MEDICINE

## 2024-12-06 PROCEDURE — 84484 ASSAY OF TROPONIN QUANT: CPT | Performed by: FAMILY MEDICINE

## 2024-12-06 PROCEDURE — 83036 HEMOGLOBIN GLYCOSYLATED A1C: CPT | Performed by: FAMILY MEDICINE

## 2024-12-06 PROCEDURE — 81003 URINALYSIS AUTO W/O SCOPE: CPT | Performed by: FAMILY MEDICINE

## 2024-12-06 PROCEDURE — 99239 HOSP IP/OBS DSCHRG MGMT >30: CPT | Performed by: INTERNAL MEDICINE

## 2024-12-06 PROCEDURE — 80307 DRUG TEST PRSMV CHEM ANLYZR: CPT | Performed by: FAMILY MEDICINE

## 2024-12-06 PROCEDURE — 80048 BASIC METABOLIC PNL TOTAL CA: CPT | Performed by: FAMILY MEDICINE

## 2024-12-06 PROCEDURE — 99254 IP/OBS CNSLTJ NEW/EST MOD 60: CPT | Performed by: INTERNAL MEDICINE

## 2024-12-06 PROCEDURE — G0378 HOSPITAL OBSERVATION PER HR: HCPCS

## 2024-12-06 PROCEDURE — 83690 ASSAY OF LIPASE: CPT | Performed by: FAMILY MEDICINE

## 2024-12-06 RX ORDER — BISACODYL 10 MG
10 SUPPOSITORY, RECTAL RECTAL DAILY PRN
Status: DISCONTINUED | OUTPATIENT
Start: 2024-12-06 | End: 2024-12-06 | Stop reason: HOSPADM

## 2024-12-06 RX ORDER — HYDROCHLOROTHIAZIDE 25 MG/1
6.25 TABLET ORAL DAILY
Status: DISCONTINUED | OUTPATIENT
Start: 2024-12-06 | End: 2024-12-06

## 2024-12-06 RX ORDER — SODIUM CHLORIDE 0.9 % (FLUSH) 0.9 %
10 SYRINGE (ML) INJECTION EVERY 12 HOURS SCHEDULED
Status: DISCONTINUED | OUTPATIENT
Start: 2024-12-06 | End: 2024-12-06 | Stop reason: HOSPADM

## 2024-12-06 RX ORDER — POLYETHYLENE GLYCOL 3350 17 G/17G
17 POWDER, FOR SOLUTION ORAL DAILY PRN
Status: DISCONTINUED | OUTPATIENT
Start: 2024-12-06 | End: 2024-12-06 | Stop reason: HOSPADM

## 2024-12-06 RX ORDER — ONDANSETRON 2 MG/ML
4 INJECTION INTRAMUSCULAR; INTRAVENOUS EVERY 6 HOURS PRN
Status: DISCONTINUED | OUTPATIENT
Start: 2024-12-06 | End: 2024-12-06 | Stop reason: HOSPADM

## 2024-12-06 RX ORDER — BISACODYL 5 MG/1
5 TABLET, DELAYED RELEASE ORAL DAILY PRN
Status: DISCONTINUED | OUTPATIENT
Start: 2024-12-06 | End: 2024-12-06 | Stop reason: HOSPADM

## 2024-12-06 RX ORDER — BISOPROLOL FUMARATE 5 MG/1
5 TABLET, FILM COATED ORAL DAILY
Status: DISCONTINUED | OUTPATIENT
Start: 2024-12-07 | End: 2024-12-06 | Stop reason: HOSPADM

## 2024-12-06 RX ORDER — NITROGLYCERIN 0.4 MG/1
0.4 TABLET SUBLINGUAL
Status: DISCONTINUED | OUTPATIENT
Start: 2024-12-06 | End: 2024-12-06 | Stop reason: HOSPADM

## 2024-12-06 RX ORDER — SODIUM CHLORIDE 0.9 % (FLUSH) 0.9 %
10 SYRINGE (ML) INJECTION AS NEEDED
Status: DISCONTINUED | OUTPATIENT
Start: 2024-12-06 | End: 2024-12-06 | Stop reason: HOSPADM

## 2024-12-06 RX ORDER — SODIUM CHLORIDE 9 MG/ML
40 INJECTION, SOLUTION INTRAVENOUS AS NEEDED
Status: DISCONTINUED | OUTPATIENT
Start: 2024-12-06 | End: 2024-12-06 | Stop reason: HOSPADM

## 2024-12-06 RX ORDER — ATORVASTATIN CALCIUM 10 MG/1
10 TABLET, FILM COATED ORAL DAILY
Status: DISCONTINUED | OUTPATIENT
Start: 2024-12-06 | End: 2024-12-06 | Stop reason: HOSPADM

## 2024-12-06 RX ORDER — ACETAMINOPHEN 650 MG/1
650 SUPPOSITORY RECTAL EVERY 4 HOURS PRN
Status: DISCONTINUED | OUTPATIENT
Start: 2024-12-06 | End: 2024-12-06 | Stop reason: HOSPADM

## 2024-12-06 RX ORDER — HYDROCHLOROTHIAZIDE 25 MG/1
6.25 TABLET ORAL DAILY
Status: DISCONTINUED | OUTPATIENT
Start: 2024-12-07 | End: 2024-12-06 | Stop reason: HOSPADM

## 2024-12-06 RX ORDER — METOPROLOL TARTRATE 50 MG
50 TABLET ORAL 2 TIMES DAILY
Qty: 60 TABLET | Refills: 0 | Status: SHIPPED | OUTPATIENT
Start: 2024-12-06

## 2024-12-06 RX ORDER — ENOXAPARIN SODIUM 100 MG/ML
40 INJECTION SUBCUTANEOUS EVERY 12 HOURS
Status: DISCONTINUED | OUTPATIENT
Start: 2024-12-06 | End: 2024-12-06 | Stop reason: HOSPADM

## 2024-12-06 RX ORDER — ACETAMINOPHEN 160 MG/5ML
650 SOLUTION ORAL EVERY 4 HOURS PRN
Status: DISCONTINUED | OUTPATIENT
Start: 2024-12-06 | End: 2024-12-06 | Stop reason: HOSPADM

## 2024-12-06 RX ORDER — BISOPROLOL FUMARATE 5 MG/1
2.5 TABLET, FILM COATED ORAL DAILY
Status: DISCONTINUED | OUTPATIENT
Start: 2024-12-06 | End: 2024-12-06

## 2024-12-06 RX ORDER — ACETAMINOPHEN 325 MG/1
650 TABLET ORAL EVERY 4 HOURS PRN
Status: DISCONTINUED | OUTPATIENT
Start: 2024-12-06 | End: 2024-12-06 | Stop reason: HOSPADM

## 2024-12-06 RX ORDER — AMOXICILLIN 250 MG
2 CAPSULE ORAL 2 TIMES DAILY PRN
Status: DISCONTINUED | OUTPATIENT
Start: 2024-12-06 | End: 2024-12-06 | Stop reason: HOSPADM

## 2024-12-06 RX ADMIN — ENOXAPARIN SODIUM 40 MG: 100 INJECTION SUBCUTANEOUS at 06:42

## 2024-12-06 RX ADMIN — Medication 10 ML: at 01:50

## 2024-12-06 RX ADMIN — ATORVASTATIN CALCIUM 10 MG: 10 TABLET, FILM COATED ORAL at 09:07

## 2024-12-06 RX ADMIN — SERTRALINE HYDROCHLORIDE 50 MG: 50 TABLET ORAL at 09:07

## 2024-12-06 RX ADMIN — Medication 10 ML: at 08:53

## 2024-12-06 NOTE — PROGRESS NOTES
"Pharmacy Consult - Enoxaparin Dosing    Linn Abel is a 44 y.o. female who has been consulted to dose enoxaparin for VTE prophylaxis.     Allergies    Patient has no known allergies.    Relevant clinical data and objective history reviewed:     [Ht: 170.2 cm (67\"); Wt: 133 kg (293 lb)]  Body mass index is 45.89 kg/m².    Estimated Creatinine Clearance: 156.5 mL/min (by C-G formula based on SCr of 0.66 mg/dL).    Results from last 7 days   Lab Units 12/05/24  1819   HEMOGLOBIN g/dL 15.0   HEMATOCRIT % 44.1   PLATELETS 10*3/mm3 323   CREATININE mg/dL 0.66       Asessment/Plan    Initiate Enoxaparin 40 mg SQ every q12h hours  Pharmacy will monitor Ms. Abel's renal function and clinical status and adjust the enoxaparin dose and/or frequency as needed.    Thank you,  Catrachita Lizarraga RP,PharmD  12/6/2024  03:00 EST      "
(2) more than 100 beats/min

## 2024-12-06 NOTE — DISCHARGE SUMMARY
[facility]   DISCHARGE SUMMARY      Name:  Linn Abel   Age:  44 y.o.  Sex:  female  :  1980  MRN:  [id number]   Visit Number:  [id number]    Admission Date:  2024  Date of Discharge:  2024  Primary Care Physician:  Mikie Ang MD    Important issues to note:    Start: Metoprolol  Stop: Bisoprolol hydrochlorothiazide  Follow up: PCP and electrophysiology  Brief Summary: Presented with SVT due to probable accessory pathway to the AV node. Initially had tachycardia which improved to normal heart rate by the time of discharge.  There was concern she might be at risk for dehydration so gave her a beta-blocker without a diuretic.    Discharge Diagnoses:   SVT/palpitations, POA  Elevated troponin, POA  Hypertension  Hyperlipidemia      Problem List:     Active Hospital Problems    Diagnosis  POA    **Elevated troponin [R79.89]  Yes      Resolved Hospital Problems   No resolved problems to display.     Presenting Problem:    Chief Complaint   Patient presents with    Palpitations      Consults:     Consulting Physician(s)         Provider   Role Specialty     Peter Wallace MD      Consulting Physician Cardiology                History Of Presenting Illness:       Patient is a 44 years old female with a past medical history of hypertension, hyperlipidemia and tachycardia followed up with Dr. Rivera in the past who presented to the ER with a chief complaint of tachycardia and palpitations.  Patient reports she was feeling heart racing last night but then it stopped on its own.  She started having another sensation of palpitations and heart racing today when she was at work, she reports that her heart rate was running fast and she was able to see it on her electronic watch.  She tried Valsalva maneuver with no success on avoiding the tachycardia.  She had seen Dr. Rivera in the past after she had an episode of similar symptoms back in March, she had a Holter monitor and  "Monica Ramirez is a 83 y.o. male.   Chief Complaint   Patient presents with   • Needs referral     History of Present Illness     The patient presents today with c/o an area of skin discoloration on his right face, behind his ear. This has been present for about one year. This does seem to be enlarging and changing in color. This has not been painful or pruritic. He has seen a dermatologist in the past and has had partial removal of the auricle of his ear due to skin CA. Today, he would like referral to [facility] for further evaluation.    The following portions of the patient's history were reviewed and updated as appropriate: allergies, current medications, past family history, past medical history, past social history, past surgical history and problem list.  /64 (BP Location: Left arm, Patient Position: Sitting, Cuff Size: Adult)  Pulse 57  Temp 97.8 °F (36.6 °C) (Oral)   Ht 174 cm (68.5\")  Wt 83.6 kg (184 lb 4.8 oz)  SpO2 98%  BMI 27.61 kg/m2  Review of Systems   Constitutional: Negative for chills, fatigue and fever.   HENT: Negative for congestion, ear pain, rhinorrhea and sore throat.    Respiratory: Negative for cough, shortness of breath and wheezing.    Cardiovascular: Negative for chest pain, palpitations and leg swelling.   Gastrointestinal: Negative for abdominal pain, diarrhea, nausea and vomiting.   Genitourinary: Negative for dysuria.   Musculoskeletal: Negative for back pain and myalgias.   Skin: Positive for color change. Negative for rash and wound.   Neurological: Negative for dizziness, light-headedness and headaches.   Psychiatric/Behavioral: Negative for sleep disturbance. The patient is not nervous/anxious.        Objective   Physical Exam   Constitutional: He is oriented to person, place, and time. He appears well-developed and well-nourished.   HENT:   Head: Normocephalic and atraumatic.   Cardiovascular: Normal rate, regular rhythm and " echo done.  She went up to an urgent care today and was referred to the ER.  Patient denies chest pain, abdominal pain, nausea, vomiting or fever.     On ER evaluation, her heart rate was in the 170s on arrival with SVT, blood pressure stable and afebrile on room air.  Her labs were significant for WBC of 12.7, AST 47, sodium 129, proBNP 1041, HS Troponin 26-31-40.  TSH and magnesium WNL.  Chest x-ray with no acute infiltrates per my read.  Patient received Cardizem in the ER and her rate was controlled in sinus after the in the 80s, her symptoms resolved.  Hospitalist consulted for admission, further management and treatment.       Hospital Course:       SVT/palpitations  Elevated troponins  -Elevated troponin demand ischemia due to SVT  -Previous 2D echo from April 2024 with normal EF and no valvular abnormalities.  -Holter monitor was done back in April with no sustained arrhythmia but showed rare supraventricular and ventricular ectopy.  -Reviewed telemetry currently in sinus rhythm  -Troponin mostly stable  -Consulting cardiology, appreciate recommendations  -Continue metoprolol at discharge.  Was previously on bisoprolol HCT  -UDS and UA reviewed  -12/6/2024: New admission from overnight admission records reviewed.  Tachycardia resolved.  Patient's vitals currently very stable on room air.  Cardiology recommended follow-up with EP.  Stable for discharge.      Code Status: Full  Diet: Regular  Disposition: Home independently    Vital Signs:    Temp:  [97.5 °F (36.4 °C)-98 °F (36.7 °C)] 98 °F (36.7 °C)  Heart Rate:  [] 81  Resp:  [12-24] 12  BP: (102-140)/(70-93) 107/85    Physical Exam:    Constitutional: No acute distress, awake, alert  HENT: NCAT, mucous membranes moist  Respiratory: Clear to auscultation bilaterally, respiratory effort normal   Cardiovascular: RRR, no murmurs, rubs, or gallops  Gastrointestinal: Positive bowel sounds, soft, nontender, nondistended  Musculoskeletal: No bilateral ankle  normal heart sounds.    Pulmonary/Chest: Effort normal and breath sounds normal.   Abdominal: Soft. Bowel sounds are normal.   Musculoskeletal:   Gait and station normal   Neurological: He is alert and oriented to person, place, and time.   Skin: Skin is dry.   Quarter sized brown nevus noted behind right ear. Irregularly shaped with some discoloration.   Psychiatric: He has a normal mood and affect. His behavior is normal.       Assessment/Plan   Clinton was seen today for needs referral.    Diagnoses and all orders for this visit:    Nevus of face  -     Ambulatory Referral to Dermatology      The nevus does need further evaluation and possible biopsy. Will refer to dermatology for further evaluation and treatment. He voices understanding. Follow up here as needed and scheduled.          edema  Psychiatric: Appropriate affect, cooperative  Neurologic: Oriented x 3, speech clear  Skin: No rashes  Edited by: Lawson Sheffield DO at 12/6/2024 0833    Pertinent Lab Results:     Results from last 7 days   Lab Units 12/06/24  0859 12/05/24  1819   SODIUM mmol/L 135* 129*   POTASSIUM mmol/L 3.9 3.6   CHLORIDE mmol/L 101 95*   CO2 mmol/L 23.7 23.5   BUN mg/dL 7 9   CREATININE mg/dL 0.59 0.66   CALCIUM mg/dL 9.0 9.4   BILIRUBIN mg/dL  --  0.4   ALK PHOS U/L  --  75   ALT (SGPT) U/L  --  17   AST (SGOT) U/L  --  47*   GLUCOSE mg/dL 104* 119*     Results from last 7 days   Lab Units 12/06/24  0858 12/05/24  1819   WBC 10*3/mm3 7.91 12.74*   HEMOGLOBIN g/dL 13.8 15.0   HEMATOCRIT % 39.8 44.1   PLATELETS 10*3/mm3 270 323         Results from last 7 days   Lab Units 12/06/24  0859 12/05/24  2219 12/05/24 2004   HSTROP T ng/L 28* 40* 31*     Results from last 7 days   Lab Units 12/05/24  1819   PROBNP pg/mL 1,041.0*         Results from last 7 days   Lab Units 12/06/24  0859   LIPASE U/L 23               Pertinent Radiology Results:    Imaging Results (All)       Procedure Component Value Units Date/Time    XR Chest 1 View [[id number]] Collected: 12/06/24 0852     Updated: 12/06/24 0855    Narrative:      PROCEDURE: XR CHEST 1 VW-        HISTORY: [facility] Triage Protocol     COMPARISON: March 2024.     FINDINGS: Apical lordotic view. The heart is normal in size. The  mediastinum is unremarkable. The lungs are clear. There is no  pneumothorax. There are no acute osseous abnormalities.       Impression:      No acute cardiopulmonary process.                       Images were reviewed, interpreted, and dictated by Dr. Stephanie Brown MD  Transcribed by Elayne Norman PA-C.     This report was signed and finalized on 12/6/2024 8:53 AM by Stephanie Brown MD.               Echo:    Results for orders placed in visit on 04/04/24    Adult Transthoracic Echo Complete W/ Cont if Necessary Per Protocol    Interpretation  Summary  1.  Normal left ventricular size and systolic function, LVEF 65-70%.  2.  Normal LV diastolic filling pattern.  3.  Normal right ventricular size and systolic function.  4.  Normal left atrial volume index.  5.  No significant valvular abnormalities.    Condition on Discharge:      Stable.    Code status during the hospital stay:    Code Status and Medical Interventions: CPR (Attempt to Resuscitate); Full Support   Ordered at: 12/06/24 0137     Code Status (Patient has no pulse and is not breathing):    CPR (Attempt to Resuscitate)     Medical Interventions (Patient has pulse or is breathing):    Full Support     Discharge Disposition:    Home or Self Care    Discharge Medications:       Discharge Medications        New Medications        Instructions Start Date   metoprolol tartrate 50 MG tablet  Commonly known as: Lopressor   50 mg, Oral, 2 Times Daily             Continue These Medications        Instructions Start Date   ALPRAZolam 0.25 MG tablet  Commonly known as: XANAX   0.25 mg      atorvastatin 10 MG tablet  Commonly known as: LIPITOR   10 mg, Oral, Daily      sertraline 50 MG tablet  Commonly known as: ZOLOFT   50 mg             Stop These Medications      bisoprolol-hydrochlorothiazide 2.5-6.25 MG per tablet  Commonly known as: ZIAC            Discharge Diet:     Diet Instructions       Advance Diet As Tolerated -Target Diet: heart healthy diet      Target Diet: heart healthy diet          Activity at Discharge:       Follow-up Appointments:    Additional Instructions for the Follow-ups that You Need to Schedule       Discharge Follow-up with PCP   As directed       Currently Documented PCP:    Mikie Ang MD    PCP Phone Number:    [phone]     Follow Up Details: 1 week        Discharge Follow-up with Specified Provider: EP on Thursday as scheduled   As directed      To: EP on Thursday as scheduled               Follow-up Information       Mikie Ang MD .    Specialty: Internal  Medicine  Why: 1 week  Contact information:  [address] [facility] [address]  [phone]                           No future appointments.  Test Results Pending at Discharge:    Pending Labs       Order Current Status    High Sensitivity Troponin T 2Hr In process               Lawson Sheffield DO  12/06/24  11:42 EST    Time: I spent 45 minutes on this discharge activity which included: face-to-face encounter with the patient, reviewing the data in the system, coordination of the care with the nursing staff as well as consultants, documentation, and entering orders.     Dictated utilizing Dragon dictation.

## 2024-12-06 NOTE — CONSULTS
"[facility]-Cardiology Consult Note    Referring Provider: Nettie  Reason for Consultation: SVT    Patient Care Team:  Mikie Ang MD as PCP - General (Internal Medicine)    Chief complaint : Palpitations    Subjective:    History of present illness: This is a 44-year-old female patient with a 3 to 4-month history of palpitations.  The patient presented to the emergency room with palpitations and a sense that her heart was \"racing\".  Twelve-lead electrocardiogram showed a narrow complex regular tachycardia up to 170 bpm.  This appeared to be either atrial tachycardia or atypical atrial flutter.  Cardiac troponins were minimally elevated in a \"plateau-type\" pattern.  Twelve-lead electrocardiogram showed no ischemic ST-T wave changes or injury current despite heart rates up to 170 bpm.  She recently had an outpatient echo and cardiac monitor in cardiology clinic with Dr. Rivera which was normal.  Thyroid function testing was normal.  Urine drug screen showed no \"stimulants\".  She has morbid obesity, hypertension and dyslipidemia.  She is a non-smoker.    Review of Systems   Review of Systems   Constitutional: Negative for chills, diaphoresis, fever, malaise/fatigue, weight gain and weight loss.   HENT:  Negative for ear discharge, hearing loss, hoarse voice and nosebleeds.    Eyes:  Negative for discharge, double vision, pain and photophobia.   Cardiovascular:  Positive for palpitations. Negative for chest pain, claudication, cyanosis, dyspnea on exertion, irregular heartbeat, leg swelling, near-syncope, orthopnea, paroxysmal nocturnal dyspnea and syncope.   Respiratory:  Negative for cough, hemoptysis, sputum production and wheezing.    Endocrine: Negative for cold intolerance, heat intolerance, polydipsia, polyphagia and polyuria.   Hematologic/Lymphatic: Negative for adenopathy and bleeding problem. Does not bruise/bleed easily.   Skin:  Negative for color change, flushing, itching and rash.   Musculoskeletal:  Negative for " "muscle cramps, muscle weakness, myalgias and stiffness.   Gastrointestinal:  Negative for abdominal pain, diarrhea, hematemesis, hematochezia, nausea and vomiting.   Genitourinary:  Negative for dysuria, frequency and nocturia.   Neurological:  Negative for focal weakness, loss of balance, numbness, paresthesias and seizures.   Psychiatric/Behavioral:  Negative for altered mental status, hallucinations and suicidal ideas.    Allergic/Immunologic: Negative for HIV exposure, hives and persistent infections.       History  Past Medical History:   Diagnosis Date    Hyperlipidemia     Hypertension     Tachycardia    , History reviewed. No pertinent surgical history., History reviewed. No pertinent family history.,   Social History     Tobacco Use    Smoking status: Never    Smokeless tobacco: Never   Vaping Use    Vaping status: Never Used   Substance Use Topics    Drug use: Never   , (Not in a hospital admission)   and Allergies:  Patient has no known allergies.    Objective:    Vital Sign Min/Max for last 24 hours  Temp  Min: 97.5 °F (36.4 °C)  Max: 98 °F (36.7 °C)   BP  Min: 102/70  Max: 140/93   Pulse  Min: 65  Max: 178   Resp  Min: 18  Max: 24   SpO2  Min: 95 %  Max: 100 %   No data recorded   Weight  Min: 133 kg (293 lb)  Max: 133 kg (293 lb)     Flowsheet Rows      Flowsheet Row First Filed Value   Admission Height 170.2 cm (67\") Documented at 12/05/2024 1811   Admission Weight 133 kg (293 lb) Documented at 12/05/2024 1811                 Physical Exam:   Vitals and nursing note reviewed.   Constitutional:       Appearance: Healthy appearance. Not in distress.   Neck:      Vascular: No JVR. JVD normal.   Pulmonary:      Effort: Pulmonary effort is normal.      Breath sounds: Normal breath sounds. No wheezing. No rhonchi. No rales.   Chest:      Chest wall: Not tender to palpatation.   Cardiovascular:      PMI at left midclavicular line. Normal rate. Regular rhythm. Normal S1. Normal S2.       Murmurs: There is no " murmur.      No gallop.  No click. No rub.   Pulses:     Intact distal pulses.   Edema:     Peripheral edema absent.   Abdominal:      General: Bowel sounds are normal.      Palpations: Abdomen is soft.      Tenderness: There is no abdominal tenderness.   Musculoskeletal: Normal range of motion.         General: No tenderness. Skin:     General: Skin is warm and dry.   Neurological:      General: No focal deficit present.      Mental Status: Alert and oriented to person, place and time.         Results Review:   I reviewed the patient's new clinical results.  Results from last 7 days   Lab Units 12/06/24  0858 12/05/24  1819   WBC 10*3/mm3 7.91 12.74*   HEMOGLOBIN g/dL 13.8 15.0   HEMATOCRIT % 39.8 44.1   PLATELETS 10*3/mm3 270 323     Results from last 7 days   Lab Units 12/06/24  0859 12/05/24  1819   SODIUM mmol/L 135* 129*   POTASSIUM mmol/L 3.9 3.6   CHLORIDE mmol/L 101 95*   CO2 mmol/L 23.7 23.5   BUN mg/dL 7 9   CREATININE mg/dL 0.59 0.66   GLUCOSE mg/dL 104* 119*   CALCIUM mg/dL 9.0 9.4     Lab Results   Lab Value Date/Time    TROPONINT 28 (H) 12/06/2024 0859    TROPONINT 40 (H) 12/05/2024 2219    TROPONINT 31 (H) 12/05/2024 2004    TROPONINT 26 (H) 12/05/2024 1819    TROPONINT <6 03/03/2024 2300    TROPONINT <6 03/03/2024 1943             Assessment/Plan:      Elevated troponin      Continue beta-blocker therapy.  The patient is advised to keep her scheduled follow-up with electrophysiology in [place].  No further cardiovascular testing indicated.  The patient may be discharged to home.    I discussed the patient's findings and my recommendations with patient and family    Peter Wallace MD  12/06/24  09:42 EST

## 2024-12-06 NOTE — H&P
[facility]   [facility]      Name:  Linn Abel   Age:  44 y.o.  Sex:  female  :  1980  MRN:  [id number]   Visit Number:  [id number]  Admission Date:  2024  Date Of Service:  24  Primary Care Physician:  Mikie Ang MD    Chief Complaint:     Palpitations    History Of Presenting Illness:      Patient is a 44 years old female with a past medical history of hypertension, hyperlipidemia and tachycardia followed up with Dr. Rivera in the past who presented to the ER with a chief complaint of tachycardia and palpitations.  Patient reports she was feeling heart racing last night but then it stopped on its own.  She started having another sensation of palpitations and heart racing today when she was at work, she reports that her heart rate was running fast and she was able to see it on her electronic watch.  She tried Valsalva maneuver with no success on avoiding the tachycardia.  She had seen Dr. Rivera in the past after she had an episode of similar symptoms back in March, she had a Holter monitor and echo done.  She went up to an urgent care today and was referred to the ER.  Patient denies chest pain, abdominal pain, nausea, vomiting or fever.    On ER evaluation, her heart rate was in the 170s on arrival with SVT, blood pressure stable and afebrile on room air.  Her labs were significant for WBC of 12.7, AST 47, sodium 129, proBNP 1041, HS Troponin 26-31-40.  TSH and magnesium WNL.  Chest x-ray with no acute infiltrates per my read.  Patient received Cardizem in the ER and her rate was controlled in sinus after the in the 80s, her symptoms resolved.  Hospitalist consulted for admission, further management and treatment.    Review Of Systems:    All systems were reviewed and negative except as mentioned in history of presenting illness, assessment and plan.    Past Medical History: Patient  has a past medical history of Hyperlipidemia, Hypertension, and  "Tachycardia.    Past Surgical History: Patient  has no past surgical history on file.    Social History: Patient  reports that she has never smoked. She has never used smokeless tobacco. She reports that she does not use drugs.    Family History:  Patient's family history has been reviewed and found to be noncontributory.     Allergies:      Patient has no known allergies.    Home Medications:    Prior to Admission Medications       Prescriptions Last Dose Informant Patient Reported? Taking?    ALPRAZolam (XANAX) 0.25 MG tablet   Yes No    Take 1 tablet by mouth.    atorvastatin (LIPITOR) 10 MG tablet   Yes No    Take 1 tablet by mouth Daily.    bisoprolol-hydrochlorothiazide (ZIAC) 2.5-6.25 MG per tablet   Yes No    Take 1 tablet by mouth Daily.    sertraline (ZOLOFT) 50 MG tablet   Yes No    Take 1 tablet by mouth.          ED Medications:    Medications   sodium chloride 0.9 % flush 10 mL (10 mL Intravenous Given 12/5/24 1849)   dilTIAZem (CARDIZEM) injection 20 mg (20 mg Intravenous Given 12/5/24 1847)     Vital Signs:  Temp:  [97.5 °F (36.4 °C)-98 °F (36.7 °C)] 98 °F (36.7 °C)  Heart Rate:  [] 70  Resp:  [18-24] 18  BP: (102-140)/(70-93) 103/77        12/05/24  1811   Weight: 133 kg (293 lb)     Body mass index is 45.89 kg/m².    Physical Exam:     Most recent vital Signs: /77   Pulse 70   Temp 98 °F (36.7 °C) (Oral)   Resp 18   Ht 170.2 cm (67\")   Wt 133 kg (293 lb)   SpO2 98%   BMI 45.89 kg/m²     Physical Exam  Vitals and nursing note reviewed.   Constitutional:       General: She is not in acute distress.     Appearance: Normal appearance. She is obese.   HENT:      Head: Normocephalic and atraumatic.      Nose: Nose normal.      Mouth/Throat:      Mouth: Mucous membranes are moist.   Eyes:      Extraocular Movements: Extraocular movements intact.      Conjunctiva/sclera: Conjunctivae normal.      Pupils: Pupils are equal, round, and reactive to light.   Cardiovascular:      Rate and " "Rhythm: Normal rate and regular rhythm.      Pulses: Normal pulses.      Heart sounds: Normal heart sounds.   Pulmonary:      Effort: Pulmonary effort is normal. No respiratory distress.      Breath sounds: Normal breath sounds. No wheezing or rhonchi.   Abdominal:      General: Bowel sounds are normal. There is no distension.      Palpations: Abdomen is soft.      Tenderness: There is no abdominal tenderness.   Musculoskeletal:         General: Normal range of motion.      Cervical back: Normal range of motion and neck supple.      Right lower leg: No edema.      Left lower leg: No edema.   Skin:     General: Skin is warm and dry.      Findings: No rash.   Neurological:      General: No focal deficit present.      Mental Status: She is alert and oriented to person, place, and time. Mental status is at baseline.      Motor: No weakness.   Psychiatric:         Mood and Affect: Mood normal.         Behavior: Behavior normal.         Thought Content: Thought content normal.         Laboratory data:    I have reviewed the labs done in the emergency room.    Results from last 7 days   Lab Units 12/05/24  1819   SODIUM mmol/L 129*   POTASSIUM mmol/L 3.6   CHLORIDE mmol/L 95*   CO2 mmol/L 23.5   BUN mg/dL 9   CREATININE mg/dL 0.66   CALCIUM mg/dL 9.4   BILIRUBIN mg/dL 0.4   ALK PHOS U/L 75   ALT (SGPT) U/L 17   AST (SGOT) U/L 47*   GLUCOSE mg/dL 119*     Results from last 7 days   Lab Units 12/05/24  1819   WBC 10*3/mm3 12.74*   HEMOGLOBIN g/dL 15.0   HEMATOCRIT % 44.1   PLATELETS 10*3/mm3 323         Results from last 7 days   Lab Units 12/05/24  2219 12/05/24  2004 12/05/24  1819   HSTROP T ng/L 40* 31* 26*     Results from last 7 days   Lab Units 12/05/24  1819   PROBNP pg/mL 1,041.0*                       Invalid input(s): \"USDES\", \"NITRITITE\", \"BACT\", \"EP\"    Pain Management Panel           No data to display                EKG:      EKG 1: Supraventricular tachycardia with a heart rate of 167, nonspecific ST/T wave " changes.  EKG 2: Sinus rhythm, heart rate 85, T wave inversions noted with ST depressions in multiple leads including lead II, lead III, aVF and V1 through V6.  EKG 3: Sinus rhythm, heart rate 97, T wave inversions noted in lead III and V1 otherwise nonspecific ST/T wave changes.    Radiology:    No radiology results for the last 3 days    Assessment:    SVT/palpitations, POA  Elevated troponin, POA  Hypertension  Hyperlipidemia    Plan:    Patient is admitted for further management and treatment.    SVT/palpitations  Elevated troponins  -Elevated troponin likely due to demand ischemia in settings of SVT, no chest pain, low suspicion for ACS.  -Previous 2D echo from April 2024 with normal EF and no valvular abnormalities.  -Holter monitor was done back in April with no sustained arrhythmia but showed rare supraventricular and ventricular ectopy.  -Continue to monitor on telemetry  -Trending troponins  -Consulting cardiology, appreciate recommendations  -Continue home bisoprolol and statin  -Ordered D-dimer, UDS and UA and currently pending    -Continue home meds as warranted.  -Further orders as indicated per clinical course.    Risk Assessment: Moderate to high  DVT Prophylaxis: Lovenox prophylaxis (benefit> risk)  Code Status: Full  Diet: N.p.o.    Advance Care Planning   ACP discussion was held with the patient during this visit. Patient does not have an advance directive, information provided.       Ayanna Mg MD  12/05/24  23:33 EST    Dictated utilizing Dragon dictation.

## 2024-12-06 NOTE — CASE MANAGEMENT/SOCIAL WORK
Discharge Planning Assessment   [facility]     Patient Name: Linn Abel  MRN: [id number]  Today's Date: 12/6/2024    Admit Date: 12/5/2024    Plan: The patient is awake and able to answer questions.  Her  is at bedside and she consents for him to be included in her DC plans.  She is a current patient of Dr. Ang and [facility]  She elects to enroll in Meds to Bed.  She does not use DME.  She denies the need for DME or services at DC.  At the time of DC the patient plans to return home with her .  Questions and concerns were addressed at the time of this conversation.  Will provide additional resources and information upon patient request.   Discharge Needs Assessment       Row Name 12/06/24 0955       Living Environment    People in Home spouse    Name(s) of People in Home Chris Abel,     Current Living Arrangements home    Duration at Residence 3 years    Potentially Unsafe Housing Conditions none    In the past 12 months has the electric, gas, oil, or water company threatened to shut off services in your home? No    Primary Care Provided by self    Provides Primary Care For no one    Family Caregiver if Needed none    Quality of Family Relationships helpful;involved;supportive    Able to Return to Prior Arrangements yes       Resource/Environmental Concerns    Resource/Environmental Concerns none    Transportation Concerns none       Transportation Needs    In the past 12 months, has lack of transportation kept you from medical appointments or from getting medications? no    In the past 12 months, has lack of transportation kept you from meetings, work, or from getting things needed for daily living? No       Food Insecurity    Within the past 12 months, you worried that your food would run out before you got the money to buy more. Never true    Within the past 12 months, the food you bought just didn't last and you didn't have money to get more. Never  true       Transition Planning    Patient/Family Anticipates Transition to home with family    Patient/Family Anticipated Services at Transition none    Transportation Anticipated family or friend will provide       Discharge Needs Assessment    Readmission Within the Last 30 Days no previous admission in last 30 days    Equipment Currently Used at Home none    Concerns to be Addressed denies needs/concerns at this time    Do you want help finding or keeping work or a job? I do not need or want help    Do you want help with school or training? For example, starting or completing job training or getting a high school diploma, GED or equivalent No    Anticipated Changes Related to Illness none    Equipment Needed After Discharge none    Provided Post Acute Provider List? N/A    N/A Provider List Comment Patient plans to return home; no needs at this time    Provided Post Acute Provider Quality & Resource List? N/A    N/A Quality & Resource List Comment Patient plans to return home; no needs at this time    Offered/Gave Vendor List no                   Discharge Plan       Row Name 12/06/24 0956       Plan    Plan The patient is awake and able to answer questions.  Her  is at bedside and she consents for him to be included in her DC plans.  She is a current patient of Dr. Ang and gets her medications from [facility].  She elects to enroll in Meds to Bed.  She does not use DME.  She denies the need for DME or services at DC.  At the time of DC the patient plans to return home with her .  Questions and concerns were addressed at the time of this conversation.  Will provide additional resources and information upon patient request.    Patient/Family in Agreement with Plan yes    Provided Post Acute Provider List? N/A    N/A Provider List Comment Patient plans to return home; no needs at this time    Provided Post Acute Provider Quality & Resource List? N/A    N/A Quality & Resource List Comment Patient plans  to return home; no needs at this time    Plan Comments Patient denies needs at this time    Final Discharge Disposition Code 01 - home or self-care    Final Note Patient plans to return home with                   Continued Care and Services - Admitted Since 12/5/2024    No active coordination exists for this encounter.          Demographic Summary       Row Name 12/06/24 0953       General Information    Admission Type observation    Arrived From emergency department    Referral Source admission list    Reason for Consult discharge planning    Preferred Language English       Contact Information    Permission Granted to Share Info With ;family/designee                   Functional Status       Row Name 12/06/24 0953       Functional Status    Usual Activity Tolerance excellent    Current Activity Tolerance good       Physical Activity    On average, how many days per week do you engage in moderate to strenuous exercise (like a brisk walk)? 0 days    On average, how many minutes do you engage in exercise at this level? 0 min    Number of minutes of exercise per week 0       Assessment of Health Literacy    How often do you have someone help you read hospital materials? Never    How often do you have problems learning about your medical condition because of difficulty understanding written information? Never    How often do you have a problem understanding what is told to you about your medical condition? Never    How confident are you filling out medical forms by yourself? Extremely    Health Literacy Excellent       Functional Status, IADL    Medications independent    Meal Preparation independent    Housekeeping independent    Laundry independent    Shopping independent    If for any reason you need help with day-to-day activities such as bathing, preparing meals, shopping, managing finances, etc., do you get the help you need? I don't need any help       Mental Status    General Appearance WDL  WDL       Mental Status Summary    Recent Changes in Mental Status/Cognitive Functioning no changes       Employment/    Employment Status employed full-time                   Psychosocial       Row Name 12/06/24 0954       Values/Beliefs    Spiritual, Cultural Beliefs, Hindu Practices, Values that Affect Care no       Behavior WDL    Behavior WDL WDL       Emotion Mood WDL    Emotion/Mood/Affect WDL WDL       Speech WDL    Speech WDL WDL       Perceptual State WDL    Perceptual State WDL WDL       Thought Process WDL    Thought Process WDL WDL       Intellectual Performance WDL    Intellectual Performance WDL WDL                   Abuse/Neglect       Row Name 12/06/24 0954       Personal Safety    Feels Unsafe at Home or Work/School no    Feels Threatened by Someone no    Does Anyone Try to Keep You From Having Contact with Others or Doing Things Outside Your Home? no    Physical Signs of Abuse Present no                   Legal       Row Name 12/06/24 0954       Financial Resource Strain    How hard is it for you to pay for the very basics like food, housing, medical care, and heating? Not hard                   Substance Abuse       Row Name 12/06/24 0954       Substance Use    Substance Use Status current alcohol use    Last Alcohol Use --  Patient reports a few drinks a couple times a week                   Patient Forms    No documentation.                     Kerline Brandt, RN

## 2024-12-06 NOTE — CASE MANAGEMENT/SOCIAL WORK
Case Management Discharge Note      Final Note: Patient to DC home with spouse via personal vehicle. No new needs noted at this time.    Provided Post Acute Provider List?: N/A  N/A Provider List Comment: Patient plans to return home; no needs at this time  Provided Post Acute Provider Quality & Resource List?: N/A  N/A Quality & Resource List Comment: Patient plans to return home; no needs at this time    Selected Continued Care - Admitted Since 12/5/2024       Destination    No services have been selected for the patient.                Durable Medical Equipment    No services have been selected for the patient.                Dialysis/Infusion    No services have been selected for the patient.                Home Medical Care    No services have been selected for the patient.                Therapy    No services have been selected for the patient.                Community Resources    No services have been selected for the patient.                Community & DME    No services have been selected for the patient.                    Transportation Services  Private: Car    Final Discharge Disposition Code: 01 - home or self-care

## 2024-12-11 ENCOUNTER — IMAGING SERVICES (OUTPATIENT)
Dept: OTHER | Age: 44
End: 2024-12-11